# Patient Record
Sex: MALE | Race: WHITE | NOT HISPANIC OR LATINO | URBAN - METROPOLITAN AREA
[De-identification: names, ages, dates, MRNs, and addresses within clinical notes are randomized per-mention and may not be internally consistent; named-entity substitution may affect disease eponyms.]

---

## 2022-10-18 ENCOUNTER — OFFICE VISIT (OUTPATIENT)
Dept: UROLOGY | Facility: MEDICAL CENTER | Age: 47
End: 2022-10-18
Payer: COMMERCIAL

## 2022-10-18 ENCOUNTER — TELEPHONE (OUTPATIENT)
Dept: UROLOGY | Facility: MEDICAL CENTER | Age: 47
End: 2022-10-18

## 2022-10-18 VITALS
DIASTOLIC BLOOD PRESSURE: 82 MMHG | WEIGHT: 179 LBS | OXYGEN SATURATION: 98 % | BODY MASS INDEX: 27.13 KG/M2 | HEART RATE: 65 BPM | SYSTOLIC BLOOD PRESSURE: 132 MMHG | HEIGHT: 68 IN

## 2022-10-18 DIAGNOSIS — Z80.42 FAMILY HISTORY OF PROSTATE CANCER: ICD-10-CM

## 2022-10-18 DIAGNOSIS — Z12.5 PROSTATE CANCER SCREENING: ICD-10-CM

## 2022-10-18 DIAGNOSIS — R97.20 ELEVATED PSA: Primary | ICD-10-CM

## 2022-10-18 DIAGNOSIS — N13.8 BPH WITH OBSTRUCTION/LOWER URINARY TRACT SYMPTOMS: ICD-10-CM

## 2022-10-18 DIAGNOSIS — R97.20 INCREASED PROSTATE SPECIFIC ANTIGEN (PSA) VELOCITY: ICD-10-CM

## 2022-10-18 DIAGNOSIS — N40.1 BPH WITH OBSTRUCTION/LOWER URINARY TRACT SYMPTOMS: ICD-10-CM

## 2022-10-18 LAB
SL AMB  POCT GLUCOSE, UA: NORMAL
SL AMB LEUKOCYTE ESTERASE,UA: NORMAL
SL AMB POCT BILIRUBIN,UA: NORMAL
SL AMB POCT BLOOD,UA: NORMAL
SL AMB POCT CLARITY,UA: CLEAR
SL AMB POCT COLOR,UA: YELLOW
SL AMB POCT KETONES,UA: NORMAL
SL AMB POCT NITRITE,UA: NORMAL
SL AMB POCT PH,UA: 7
SL AMB POCT SPECIFIC GRAVITY,UA: 1.01
SL AMB POCT URINE PROTEIN: NORMAL
SL AMB POCT UROBILINOGEN: 0.2

## 2022-10-18 PROCEDURE — 81003 URINALYSIS AUTO W/O SCOPE: CPT | Performed by: UROLOGY

## 2022-10-18 PROCEDURE — 99204 OFFICE O/P NEW MOD 45 MIN: CPT | Performed by: UROLOGY

## 2022-10-18 NOTE — PROGRESS NOTES
Assessment/Plan:  1  Elevated PSA-the patient has a family history of malignancies with a sister with breast cancer and a maternal uncle with prostate cancer and a father with an elevated PSA due to BPH  The patient's PSA has significantly elevated with no suspicious areas on FREDDY  Plan multiparametric MRI of the prostate and repeat PSA with free fraction  2  Elevated PSA velocity-as above    3  BPH with lower urinary tract obstructive symptoms-AUA symptom score is 10 with some urgency weakening of the urinary stream and mild straining  He does have palpable enlargement on FREDDY  4  Family history of prostate cancer-as above  No problem-specific Assessment & Plan notes found for this encounter  Diagnoses and all orders for this visit:    Elevated PSA  -     POCT urine dip auto non-scope    BPH with obstruction/lower urinary tract symptoms    Prostate cancer screening    Family history of prostate cancer    Increased prostate specific antigen (PSA) velocity          Subjective:      Patient ID: Tg Alford is a 41 y o  male  HPI  72-year-old male with family history of prostate cancer in sister with breast cancer presents with an elevated PSA for his age  PSA has risen from 1 to over 3 in the past 2 years  The patient does have some irritative and obstructive voiding symptoms due to prostate enlargement with an AUA symptom score of 10  He presents for evaluation         The following portions of the patient's history  were reviewed and updated as appropriate: allergies, current medications, past family history, past medical history, past social history, past surgical history and problem list     Review of Systems   All other systems reviewed and are negative  Objective:      /82   Pulse 65   Ht 5' 8" (1 727 m)   Wt 81 2 kg (179 lb)   SpO2 98%   BMI 27 22 kg/m²          Physical Exam  Vitals reviewed  Constitutional:       General: He is not in acute distress  Appearance: Normal appearance  He is normal weight  He is not ill-appearing, toxic-appearing or diaphoretic  HENT:      Head: Normocephalic and atraumatic  Nose: Nose normal       Mouth/Throat:      Mouth: Mucous membranes are dry  Eyes:      Extraocular Movements: Extraocular movements intact  Pulmonary:      Effort: Pulmonary effort is normal    Genitourinary:     Penis: Normal        Testes: Normal       Rectum: Normal       Comments: FREDDY normal anal verge normal anal sphincter tone no palpable rectal masses  Prostate approximately 35 g a nodular nontender  Neurological:      Mental Status: He is alert and oriented to person, place, and time  Psychiatric:         Mood and Affect: Mood normal          Behavior: Behavior normal          Thought Content:  Thought content normal          Judgment: Judgment normal

## 2023-01-18 ENCOUNTER — OFFICE VISIT (OUTPATIENT)
Dept: UROLOGY | Facility: MEDICAL CENTER | Age: 48
End: 2023-01-18

## 2023-01-18 VITALS
DIASTOLIC BLOOD PRESSURE: 74 MMHG | OXYGEN SATURATION: 96 % | HEIGHT: 68 IN | SYSTOLIC BLOOD PRESSURE: 118 MMHG | HEART RATE: 70 BPM | WEIGHT: 177 LBS | BODY MASS INDEX: 26.83 KG/M2

## 2023-01-18 DIAGNOSIS — Z80.42 FAMILY HISTORY OF PROSTATE CANCER: Primary | ICD-10-CM

## 2023-01-18 DIAGNOSIS — N13.8 BPH WITH OBSTRUCTION/LOWER URINARY TRACT SYMPTOMS: ICD-10-CM

## 2023-01-18 DIAGNOSIS — Z12.5 PROSTATE CANCER SCREENING: ICD-10-CM

## 2023-01-18 DIAGNOSIS — N40.1 BPH WITH OBSTRUCTION/LOWER URINARY TRACT SYMPTOMS: ICD-10-CM

## 2023-01-18 NOTE — PROGRESS NOTES
100% counseling 25 minutes    The patient returns to the office we had a long discussion concerning the fact that his insurance company refused to pay for coverage for multi parametric MRI  I explained to the patient and he agrees that his PSA of approximately 3 3 was abnormal for a 42-year-old male and taking into account his family history of prostate cancer and is higher risk of prostate cancer MRI was recommended  Apparently another PSA was performed by his PCP and the second PSA was back to the patient's baseline at approximately 1 7  Patient is aware that the previous age related elevated PSA may actually be the true PSA and the repeat performed afterwards may be wrong as well however it is more in line with his previous baseline PSA  In any event with no palpable evidence of suspicious lesions on the prostate by FREDDY I will plan to repeat a PSA in 6 months  If the patient has elevation in his PSA MRI will again be ordered